# Patient Record
(demographics unavailable — no encounter records)

---

## 2024-10-17 NOTE — PHYSICAL EXAM
[No Acute Distress] : no acute distress [Well Nourished] : well nourished [Well Developed] : well developed [Well-Appearing] : well-appearing [Normal Sclera/Conjunctiva] : normal sclera/conjunctiva [PERRL] : pupils equal round and reactive to light [EOMI] : extraocular movements intact [Normal Outer Ear/Nose] : the outer ears and nose were normal in appearance [Normal Oropharynx] : the oropharynx was normal [No JVD] : no jugular venous distention [No Lymphadenopathy] : no lymphadenopathy [Supple] : supple [Thyroid Normal, No Nodules] : the thyroid was normal and there were no nodules present [No Respiratory Distress] : no respiratory distress  [No Accessory Muscle Use] : no accessory muscle use [Clear to Auscultation] : lungs were clear to auscultation bilaterally [Normal Rate] : normal rate  [Regular Rhythm] : with a regular rhythm [Normal S1, S2] : normal S1 and S2 [No Murmur] : no murmur heard [No Carotid Bruits] : no carotid bruits [No Abdominal Bruit] : a ~M bruit was not heard ~T in the abdomen [No Varicosities] : no varicosities [Pedal Pulses Present] : the pedal pulses are present [No Edema] : there was no peripheral edema [No Palpable Aorta] : no palpable aorta [No Extremity Clubbing/Cyanosis] : no extremity clubbing/cyanosis [Soft] : abdomen soft [Non Tender] : non-tender [Non-distended] : non-distended [No Masses] : no abdominal mass palpated [No HSM] : no HSM [Normal Bowel Sounds] : normal bowel sounds [Normal Posterior Cervical Nodes] : no posterior cervical lymphadenopathy [Normal Anterior Cervical Nodes] : no anterior cervical lymphadenopathy [No CVA Tenderness] : no CVA  tenderness [No Spinal Tenderness] : no spinal tenderness [No Rash] : no rash [Coordination Grossly Intact] : coordination grossly intact [No Focal Deficits] : no focal deficits [Normal Gait] : normal gait [Deep Tendon Reflexes (DTR)] : deep tendon reflexes were 2+ and symmetric [Normal Affect] : the affect was normal [Normal Insight/Judgement] : insight and judgment were intact

## 2024-10-22 NOTE — HISTORY OF PRESENT ILLNESS
[FreeTextEntry1] : Follow-up. [de-identified] : 69 y/o M w/ DMII, HTN, HLD, Depression, Insomnia gout is here follow-up. Pt has been compliant with his DM, HTN, DLD medications. He stopped taking bupropion, allopurinol and colchicine. No complaints except for new right shoulder pain. Pain started week ago. No trauma. No aggravating or alleviating factors.

## 2024-10-22 NOTE — ASSESSMENT
[FreeTextEntry1] : 67 y/o M w/ DMII, HTN, HLD, gout, depression is here for follow-up.  #Insomnia  # ANA - Sleep test showed severe obstructive sleep apnea - Pt visited Pulm, recommended CPAP device.   #Depression - Pt stopped Bupropion, he says that he doesn't need it anymore.   #DMII - No new A1c this visit  (6.8 in 12/2023) - Today, fingerstick 115 - C/w metformin 1000 mg BID, Repaglinide 1 BID - commended on keep a1c stable, counseled on lifestyle modifications  #DLD - /LDL 63/cholesterol 131 - C/w atorvastatin 10 mg qhs - C/w gemfibrozil 600 mg daily  #HTN - C/w losartan 100 mg  # New right shoulder pain.  #Left Knee (resolved) - Right shoulder pain started week ago. No trauma. No aggravating or alleviating factors. - Limited Passive and active ROM because of pain. - Meloxicam 7.5 daily - Uric acid was elevated 10.2 (5/2023). Pt was on allopurinol and Colchicine, then he stopped taking them.   #elevated alkaline phosphatase Dec 2023 - no abdominal complaints - Trend ALP - GGT for the next visit  #HCM - Last colonoscopy in [facility] on 1/30/2024 -> Sessile Serrated Polyp -> Will Follow up - Influenza vaccination sent to pharmacy. - RTC in 4 months with labs.

## 2024-10-22 NOTE — HISTORY OF PRESENT ILLNESS
[FreeTextEntry1] : Follow-up. [de-identified] : 69 y/o M w/ DMII, HTN, HLD, Depression, Insomnia gout is here follow-up. Pt has been compliant with his DM, HTN, DLD medications. He stopped taking bupropion, allopurinol and colchicine. No complaints except for new right shoulder pain. Pain started week ago. No trauma. No aggravating or alleviating factors.

## 2024-10-22 NOTE — ASSESSMENT
[FreeTextEntry1] : 69 y/o M w/ DMII, HTN, HLD, gout, depression is here for follow-up.  #Insomnia  # ANA - Sleep test showed severe obstructive sleep apnea - Pt visited Pulm, recommended CPAP device.   #Depression - Pt stopped Bupropion, he says that he doesn't need it anymore.   #DMII - No new A1c this visit  (6.8 in 12/2023) - Today, fingerstick 115 - C/w metformin 1000 mg BID, Repaglinide 1 BID - commended on keep a1c stable, counseled on lifestyle modifications  #DLD - /LDL 63/cholesterol 131 - C/w atorvastatin 10 mg qhs - C/w gemfibrozil 600 mg daily  #HTN - C/w losartan 100 mg  # New right shoulder pain.  #Left Knee (resolved) - Right shoulder pain started week ago. No trauma. No aggravating or alleviating factors. - Limited Passive and active ROM because of pain. - Meloxicam 7.5 daily - Uric acid was elevated 10.2 (5/2023). Pt was on allopurinol and Colchicine, then he stopped taking them.   #elevated alkaline phosphatase Dec 2023 - no abdominal complaints - Trend ALP - GGT for the next visit  #HCM - Last colonoscopy in [facility] on 1/30/2024 -> Sessile Serrated Polyp -> Will Follow up - Influenza vaccination sent to pharmacy. - RTC in 4 months with labs.

## 2025-02-18 NOTE — ASSESSMENT
[FreeTextEntry1] : 68 y/o M w/ DMII, HTN, HLD, ANA, Depression, Insomnia gout is here follow-up.  #Insomnia  # ANA - Sleep test showed severe obstructive sleep apnea - Pt visited Pulm, recommended CPAP device. However patient did not get it because of insurance issues - Pt reports very poor sleep, multiple awakenings at night - Will follow up with Dr Ziegler  #Depression - Pt stopped Bupropion, he says that he doesn't need it anymore.   #Hypothyroidism - TSH normal - c/w levothyroxine 50mcg  #DMII - A1c 6.6 (2/25) - C/w metformin 1000 mg BID, Repaglinide 1 BID - counseled on lifestyle modifications  #DLD - /LDL 63/cholesterol 131 - C/w atorvastatin 10 mg qhs - C/w gemfibrozil 600 mg daily  #HTN - C/w losartan 100 mg - Add amlodipine 5mg today  #Hyperkalemia - K 5.2 - Repeat BMP and follow up with clinical pharmacist  # right shoulder pain.  #Left Knee (resolved) - Right shoulder pain started week ago. No trauma. No aggravating or alleviating factors. - Limited Passive and active ROM - Xray with right shoulder deformity and moderate degenerative changes - MRI shoulder - Ortho referral  #elevated alkaline phosphatase Dec 2023 - no abdominal complaints - GGT normal - Repeat lfts normal (2/25)  #HCM - Last colonoscopy in [facility] on 1/30/2024 -> Sessile Serrated Polyp -> Will Follow up - RTC in 4 months with labs.

## 2025-02-18 NOTE — REVIEW OF SYSTEMS
[Joint Pain] : joint pain [Negative] : Heme/Lymph [Muscle Weakness] : muscle weakness [FreeTextEntry9] : right shoulder pain

## 2025-02-18 NOTE — HISTORY OF PRESENT ILLNESS
[FreeTextEntry1] : follow up [de-identified] : 70 y/o M w/ DMII, HTN, HLD, ANA, Depression, Insomnia gout is here follow-up. Pt has been compliant with his DM, HTN, DLD medications. He stopped taking bupropion, allopurinol and colchicine. No complaints except for new right shoulder pain. No trauma. No aggravating or alleviating factors.

## 2025-02-27 NOTE — DISCUSSION/SUMMARY
[de-identified] : Chief complaint: Right shoulder pain  HPI: Patient is a 69-year-old right-hand-dominant male who presents to the office today for the evaluation of right shoulder pain.  He reports that the pain originally manifested 3 months ago while picking up a bag that weighed approximately 10-12 pounds.  Since that time he has been having ongoing pain to the right shoulder.  He presented to his primary care physician who sent him for x-rays of the right shoulder which were performed on 1/17/2025 with impression as per the radiologist of no acute fracture or dislocation with a chronic deformity of the superior humeral head likely related to prior trauma and mild to moderate degenerative changes in the glenohumeral joint.  Patient was prescribed meloxicam.  He reported no significant improvement.  He was subsequently sent for an MRI of the right shoulder which was performed on 2/26/2025.  Patient presents today for follow-up.  Denies fall, injury, or trauma.  No reported previous surgery to the right shoulder.  ROS: Positive for right shoulder pain  Physical examination of the RIGHT shoulder shows: No erythema  No ecchymosis  Skin is intact Active forward flexion from 0 to 100 degrees, passive forward flexion from 0 to 145 degrees Active horizontal abduction from 0 to 90 degrees, passive horizontal abduction from 0 to 145 degrees Internal rotation to L5 lumbar level negative O'Briens Test positive Speed's positive Rodríguez / Galo Impingement Test positive Neer's Test pain and weakness with Empty Can test Pain and weakness with infraspinatus/teres minor muscle testing  The MRI shows tendinosis of the degradation of the patient had three-view x-rays of the right shoulder performed at [facility] on 1/17/2025 with impression as per the radiologist: No acute fracture or dislocation.  Chronic deformity of the superior humeral head, likely related to prior trauma.  Mild to moderate degenerative change in the glenohumeral joint.  Patient had an MRI of the right shoulder performed on 2/26/2025 at [facility] with impression as per the radiologist: Complete full-thickness supraspinatus tear with proximal tendon retraction to the level of the AC joint.  Full-thickness, near complete infraspinatus tear with proximal tendon retraction to the level of the acromion, with fatty replacement of the muscle.  Nonspecific fatty replacement of the teres minor muscle.  Subscapularis insertional tendinosis.  Small glenohumeral joint effusion decompressed into the subacromial bursa.  Attenuated long head of the biceps tendon in the bicipital groove, indicative of partial tear from the glenoid insertion.  Superior glenoid labral SLAP tear with posterior tear extension into 9:00 axis.  Acromioclavicular joint mild osteoarthritis.  Assessment/plan: Right shoulder rotator cuff tear, right shoulder SLAP tear, discussed ongoing treatment options with the patient  1.  Patient reports no significant improvement with the prescribed meloxicam as per his PCP, at this time I will discontinue the meloxicam, patient will be started on diclofenac 50 mg as needed twice daily with food, patient denies any known contraindications to NSAIDs, he understands that he cannot take any other NSAIDs while taking diclofenac including but not limited to meloxicam, Advil, Motrin, Aleve, ibuprofen, he can supplement with Tylenol on an as-needed basis, Discussed that NSAIDs can increase blood pressure. Instructed patient to take blood pressure regularly at home while taking the medication and to discontinue the medication should blood pressure be elevated. Patient verbalized understanding.  2.  Discussed my recommendation for formal physical therapy with the patient, he was amenable to this, prescription was provided, the patient was also provided with a copy of the [employer] rotator cuff conditioning program so that he can perform these exercises at home 3.  Discussed the possibility of an intra-articular corticosteroid injection with the patient, he kindly deferred 4.  Patient can apply ice or heat to the right shoulder on an as-needed basis with sensory precautions 5.  Discussed with the patient that given his MRI findings there is a possibility that surgical intervention may be recommended if he does not improve with conservative measures, the patient verbalized understanding  Patient will be provided with a 6-week follow-up with our sports medicine department for repeat evaluation, he verbalized understanding of all findings in the office today, agrees to follow-up as directed [facility] Pregnancy And Lactation Text: This medication is Pregnancy Category X and contraindicated in pregnancy and in women who may become pregnant. It is unknown if this medication is excreted in breast milk.